# Patient Record
Sex: FEMALE | Race: WHITE | NOT HISPANIC OR LATINO
[De-identification: names, ages, dates, MRNs, and addresses within clinical notes are randomized per-mention and may not be internally consistent; named-entity substitution may affect disease eponyms.]

---

## 2017-08-02 ENCOUNTER — RESULT REVIEW (OUTPATIENT)
Age: 22
End: 2017-08-02

## 2018-06-21 ENCOUNTER — OUTPATIENT (OUTPATIENT)
Dept: OUTPATIENT SERVICES | Facility: HOSPITAL | Age: 23
LOS: 1 days | End: 2018-06-21
Payer: COMMERCIAL

## 2018-06-21 ENCOUNTER — APPOINTMENT (OUTPATIENT)
Dept: ULTRASOUND IMAGING | Facility: IMAGING CENTER | Age: 23
End: 2018-06-21
Payer: COMMERCIAL

## 2018-06-21 DIAGNOSIS — Z00.8 ENCOUNTER FOR OTHER GENERAL EXAMINATION: ICD-10-CM

## 2018-06-21 PROCEDURE — 76642 ULTRASOUND BREAST LIMITED: CPT

## 2018-06-21 PROCEDURE — 76642 ULTRASOUND BREAST LIMITED: CPT | Mod: 26,LT

## 2018-10-15 ENCOUNTER — RESULT REVIEW (OUTPATIENT)
Age: 23
End: 2018-10-15

## 2019-05-30 ENCOUNTER — TRANSCRIBE ORDERS (OUTPATIENT)
Dept: SCHEDULING | Facility: REHABILITATION | Age: 24
End: 2019-05-30

## 2019-05-30 DIAGNOSIS — N81.84 PELVIC MUSCLE WASTING: ICD-10-CM

## 2019-05-30 DIAGNOSIS — R10.2 PELVIC AND PERINEAL PAIN: Primary | ICD-10-CM

## 2019-06-19 ENCOUNTER — HOSPITAL ENCOUNTER (OUTPATIENT)
Dept: PHYSICAL THERAPY | Facility: HOSPITAL | Age: 24
Setting detail: THERAPIES SERIES
Discharge: HOME | End: 2019-06-19
Attending: PSYCHIATRY & NEUROLOGY
Payer: COMMERCIAL

## 2019-06-19 DIAGNOSIS — R10.2 PELVIC AND PERINEAL PAIN: ICD-10-CM

## 2019-06-19 DIAGNOSIS — N81.84 PELVIC MUSCLE WASTING: ICD-10-CM

## 2019-06-19 PROCEDURE — 97163 PT EVAL HIGH COMPLEX 45 MIN: CPT

## 2019-06-19 NOTE — LETTER
[address] [place], [place] [address]nnew[facility] [address]  [phone]  [facility]: [phone]    PHYSICAL THERAPY PLAN OF CARE    Patient Name: Jack Vanegas    Certification Dates:  From 19  To: 19  Frequency: 1 time/week Duration: 8 weeks  Other:      Provider: Chiqui Webb PT   Referring Provider: System, Provider Not In  PCP: Willie Schwab      Payor: Payor: [facility] / Plan: [employer] PLAN / Product Type: Managed Care /   Medical Diagnosis: No primary diagnosis found.     Rehab Potential:      Planned Services: The patient's treatment will include  , .     By signing this plan of care, I certify this plan of care as correct and necessary for the patient.        Physician Signature: _________________________________________ Date: _________________    Thank you for this referral. Please contact our department with any questions.      Chiqui Webb PT        Referring Provider: By co-signing this Plan of Care (POC), you agree with the planned services and interventions recommended by the therapist.         PT EVALUATION FOR OUTPATIENT THERAPY    Patient: Jack Vanegas    MRN: [id number]  : 1995 24 y.o.   Referring Physician: System, Provider Not In  Date of Visit: 2019      Certification Dates:   19 through 19    Recommended Frequency & Duration:  1 time/week for up to 8 weeks     Diagnosis:   1. Pelvic and perineal pain    2. Pelvic muscle wasting        Chief Complaints:   Chief Complaint   Patient presents with   • Pain     with penetrative intercourse       Precautions: no known precautions/restrictions    Past Medical History: History reviewed. No pertinent past medical history.    Past Surgical History: History reviewed. No pertinent surgical history.      OBJECTIVE MEASUREMENTS/DATA:    Eval Assessment      General Info         General Information - 19 0803        Session Details    Document Type initial evaluation    Mode of Treatment  physical therapy    Patient/Family Observations Patient attends PT today with complaints of pain with penetrative intercourse. No pain at rest. No history of pelvic pain or difficulty inserting tampon. She states she remembers pain with her first GYN exam. She feels it may be related to her birth control pill and the amount of lubrication she has during intercourse. This has made her fearful of being intimate. She is referred for PT.     OP Specialty Pelvic Floor       Time Calculation    Start Time 0747    Stop Time 0906    Time Calculation (min) 79 min       General Information    Referring Physician Thalia Sierra MD    Pertinent History of Current Functional Problem FSFI = 21.7/36 (Desire = 3.6/6; Arousal = 3.9/6; Lubrication = 4.2/6; Orgasm = 3.6/6; Satisfaction = 4.8/6; Pain = 1.6/6)    Existing Precautions/Restrictions no known precautions/restrictions        Pain and Vitals         Pain/Vitals - 06/19/19 0800        Pain/Comfort/Sleep    Presence of Pain complains of pain/discomfort    Preferred Pain Scale number (Numeric Rating Pain Scale)    Pain Rating (0-10): Pre Activity 6   with penetration during intercourse    Frequency other (see comments)   only with intercourse    Quality sharp;aching       Pain Intervention    Intervention  IE; education    Post Intervention Comments No Pain at evaluation; only with penetrative intercourse        Falls Assessment          Falls - 06/19/19 0807        Initial Falls Assessment    One or more falls in the last year No        Living Environment          Living Environment - 06/19/19 0806        Living Environment    Lives With other (see comments)   roommate    Living Arrangements apartment    at [facility]         Prior Level of Function - 06/19/19 0807        OTHER    Previous level of function Has always had pain with penetrative intercourse; GYN exam        ROM         Range of Motion - 06/19/19 1400        RIGHT: Lower Extremity AROM  Assessment    Hip Flexion Deficit 75 degrees    Hip Abduction Deficit --   moderate restriction    Hip External Rotation Deficit --   moderate restriction       LEFT: Lower Extremity AROM Assessment    Hip Flexion Deficit --   60    Hip Abduction Deficit --   moderate restriction    Hip External Rotation Deficit --   moderate restriction        MMT         Manual Muscle Tests - 06/19/19 1433        RIGHT: Lower Extremity Manual Muscle Test Assessment    Hip Flexion gross movement (5/5) normal    Hip Extension gross movement (5/5) normal    Hip Abduction gross movement (5/5) normal    Knee Flexion strength (5/5) normal    Knee Extension strength (5/5) normal    Ankle Dorsiflexion gross movement (5/5) normal    Ankle Plantarflexion gross movement (5/5) normal       LEFT: Lower Extremity Manual Muscle Test Assessment    Hip Flexion gross movement (5/5) normal    Hip Extension gross movement (5/5) normal    Hip Abduction gross movement (5/5) normal    Knee Flexion strength (4+/5) good plus    Knee Extension strength (5/5) normal    Ankle Dorsiflexion gross movement (5/5) normal    Ankle Plantarflexion gross movement (5/5) normal       Additional Manual Muscle Tests    Additional MMT transverse abdominus = 2+/5        Palpation         Palpation - 06/19/19 1434        Palpation    Trunk Palpation  + tenderness and restriction vaginal introitus; + tenderness and hypertonicity levator ani L > R       Manual Interventions    Manual technique #1 STM transvaginal    Manual technique #2 dilator progression therapy        Pelvic Floor         Pelvic Floor - 06/19/19 0800        Pelvic Floor PMH and Surgical History    Ovaries removed No    Bladder Suspension No    Pregnancy No    Number of pregnancies 0    Menopausal state No    Sexually active Yes    Acute infection No    Post pelvic radiation No    Sexual abuse No       Gynecological History    Endometriosis No    Dysmenorrhea No    Menopause No    PID No    Pelvic Trauma No     Fibroids No   history of ovarian cyst    Prolapse No    Pelvic Pain Yes    Sexually Active Yes       Urologic History    Urgency Yes    Frequency Yes    Leak with coughing Yes    Dysuria No    Difficulty starting flow No    Dribbling after urination No    Blood in urine No    Night voids No    Vulva pressure No    UTI No       Bowel History    Normal Yes    Constipation Yes   BM 4-5x's per week    Loose No    Incontinence No    IBS No       Pelvic Pain    Pain with intercourse Yes    Pain with penetration Yes    History of LBP No    Comments tension noted in bulbocavernosus bilareral as well as levator ani L > R       Pelvic Floor Strength    Power 2    Endurance: Time hold up to 10 seconds 3 seconds    Number of reps at hold time 4 repetitions    Number of fast twitches after 2 min rest 6            Goals        Patient Stated    • patient goals (pt-stated)            Painfree intercourse      • STG/LTG (pt-stated)            STG: Patient will verbalize positioning to relieve PFM in 4 weeks    STG: Patient will demonstrate independence in colon massage and proper defecation mechanics to promote good bowel function in 4 weeks    STG: Patient will demonstrate the use of #2 dilator with pain level of </= 4/10    STG: Patient will perform her/his home program with supervision in 4 weeks    LTG: Patient will report an improvement in sexual function by scoring </= 15/36    LTG: Patient will demonstrate independence in self-STM externally and internally in 8 weeks    LTG: Patient will demonstrate independence in use of #3-4  Dilator with pain level of </= 3/10  in 8 weeks    LTG: Patient will report a return to intercourse with pain of </= 3/10 in 8-12  weeks                  TREATMENT PLAN:      Exercises Current Session Time   NEURO RE-ED TOTAL TIME FOR SESSION Not performed   Diaphragm breathing            THER ACT TOTAL TIME FOR SESSION 0-7 Minutes   HEP Stretches to B hips; purchase dilator kit; proper defecation  mechanics; colon massage           THER EX TOTAL TIME FOR SESSION Not performed               MANUAL TOTAL TIME FOR SESSION Not performed       MODALITIES TOTAL TIME FOR SESSION Not performed                     ASSESSMENT:    This 24 y.o. year old female presents to PT with above stated diagnosis. Physical Therapy evaluation reveals   resulting in   limitations. Jack Vanegas will benefit from skilled PT services to address limitation, work towards rehab and patient goals and maximize PLOF of chosen ADLs.     Planned Services: The patient's treatment will include  , .

## 2019-06-19 NOTE — PROGRESS NOTES
Referring Provider: By co-signing this Plan of Care (POC), you agree with the planned services and interventions recommended by the therapist.         PT EVALUATION FOR OUTPATIENT THERAPY    Patient: Jack Vanegas    MRN: [id number]  : 1995 24 y.o.   Referring Physician: System, Provider Not In  Date of Visit: 2019      Certification Dates:   19 through 19    Recommended Frequency & Duration:  1 time/week for up to 8 weeks     Diagnosis:   1. Pelvic and perineal pain    2. Pelvic muscle wasting        Chief Complaints:   Chief Complaint   Patient presents with   • Pain     with penetrative intercourse       Precautions: no known precautions/restrictions    Past Medical History: History reviewed. No pertinent past medical history.    Past Surgical History: History reviewed. No pertinent surgical history.      OBJECTIVE MEASUREMENTS/DATA:    Eval Assessment      General Info         General Information - 19 0803        Session Details    Document Type initial evaluation    Mode of Treatment physical therapy    Patient/Family Observations Patient attends PT today with complaints of pain with penetrative intercourse. No pain at rest. No history of pelvic pain or difficulty inserting tampon. She states she remembers pain with her first GYN exam. She feels it may be related to her birth control pill and the amount of lubrication she has during intercourse. This has made her fearful of being intimate. She is referred for PT.     OP Specialty Pelvic Floor       Time Calculation    Start Time 0747    Stop Time 0906    Time Calculation (min) 79 min       General Information    Referring Physician Thalia Sierra MD    Pertinent History of Current Functional Problem FSFI = 21.7/36 (Desire = 3.6/6; Arousal = 3.9/6; Lubrication = 4.2/6; Orgasm = 3.6/6; Satisfaction = 4.8/6; Pain = 1.6/6)    Existing Precautions/Restrictions no known precautions/restrictions        Pain and Vitals          Pain/Vitals - 06/19/19 0800        Pain/Comfort/Sleep    Presence of Pain complains of pain/discomfort    Preferred Pain Scale number (Numeric Rating Pain Scale)    Pain Rating (0-10): Pre Activity 6   with penetration during intercourse    Frequency other (see comments)   only with intercourse    Quality sharp;aching       Pain Intervention    Intervention  IE; education    Post Intervention Comments No Pain at evaluation; only with penetrative intercourse        Falls Assessment          Falls - 06/19/19 0807        Initial Falls Assessment    One or more falls in the last year No        Living Environment          Living Environment - 06/19/19 0806        Living Environment    Lives With other (see comments)   roommate    Living Arrangements apartment    at [facility]         Prior Level of Function - 06/19/19 0807        OTHER    Previous level of function Has always had pain with penetrative intercourse; GYN exam        ROM         Range of Motion - 06/19/19 1400        RIGHT: Lower Extremity AROM Assessment    Hip Flexion Deficit 75 degrees    Hip Abduction Deficit --   moderate restriction    Hip External Rotation Deficit --   moderate restriction       LEFT: Lower Extremity AROM Assessment    Hip Flexion Deficit --   60    Hip Abduction Deficit --   moderate restriction    Hip External Rotation Deficit --   moderate restriction        MMT         Manual Muscle Tests - 06/19/19 1433        RIGHT: Lower Extremity Manual Muscle Test Assessment    Hip Flexion gross movement (5/5) normal    Hip Extension gross movement (5/5) normal    Hip Abduction gross movement (5/5) normal    Knee Flexion strength (5/5) normal    Knee Extension strength (5/5) normal    Ankle Dorsiflexion gross movement (5/5) normal    Ankle Plantarflexion gross movement (5/5) normal       LEFT: Lower Extremity Manual Muscle Test Assessment    Hip Flexion gross movement (5/5) normal    Hip Extension gross movement  (5/5) normal    Hip Abduction gross movement (5/5) normal    Knee Flexion strength (4+/5) good plus    Knee Extension strength (5/5) normal    Ankle Dorsiflexion gross movement (5/5) normal    Ankle Plantarflexion gross movement (5/5) normal       Additional Manual Muscle Tests    Additional MMT transverse abdominus = 2+/5        Palpation         Palpation - 06/19/19 1434        Palpation    Trunk Palpation  + tenderness and restriction vaginal introitus; + tenderness and hypertonicity levator ani L > R       Manual Interventions    Manual technique #1 STM transvaginal    Manual technique #2 dilator progression therapy        Pelvic Floor         Pelvic Floor - 06/19/19 0800        Pelvic Floor PMH and Surgical History    Ovaries removed No    Bladder Suspension No    Pregnancy No    Number of pregnancies 0    Menopausal state No    Sexually active Yes    Acute infection No    Post pelvic radiation No    Sexual abuse No       Gynecological History    Endometriosis No    Dysmenorrhea No    Menopause No    PID No    Pelvic Trauma No    Fibroids No   history of ovarian cyst    Prolapse No    Pelvic Pain Yes    Sexually Active Yes       Urologic History    Urgency Yes    Frequency Yes    Leak with coughing Yes    Dysuria No    Difficulty starting flow No    Dribbling after urination No    Blood in urine No    Night voids No    Vulva pressure No    UTI No       Bowel History    Normal Yes    Constipation Yes   BM 4-5x's per week    Loose No    Incontinence No    IBS No       Pelvic Pain    Pain with intercourse Yes    Pain with penetration Yes    History of LBP No    Comments tension noted in bulbocavernosus bilareral as well as levator ani L > R       Pelvic Floor Strength    Power 2    Endurance: Time hold up to 10 seconds 3 seconds    Number of reps at hold time 4 repetitions    Number of fast twitches after 2 min rest 6            Goals        Patient Stated    • patient goals (pt-stated)            Painfree  intercourse      • STG/LTG (pt-stated)            STG: Patient will verbalize positioning to relieve PFM in 4 weeks    STG: Patient will demonstrate independence in colon massage and proper defecation mechanics to promote good bowel function in 4 weeks    STG: Patient will demonstrate the use of #2 dilator with pain level of </= 4/10    STG: Patient will perform her/his home program with supervision in 4 weeks    LTG: Patient will report an improvement in sexual function by scoring </= 15/36    LTG: Patient will demonstrate independence in self-STM externally and internally in 8 weeks    LTG: Patient will demonstrate independence in use of #3-4  Dilator with pain level of </= 3/10  in 8 weeks    LTG: Patient will report a return to intercourse with pain of </= 3/10 in 8-12  weeks                  TREATMENT PLAN:      Exercises Current Session Time   NEURO RE-ED TOTAL TIME FOR SESSION Not performed   Diaphragm breathing            THER ACT TOTAL TIME FOR SESSION 0-7 Minutes   HEP Stretches to B hips; purchase dilator kit; proper defecation mechanics; colon massage           THER EX TOTAL TIME FOR SESSION Not performed               MANUAL TOTAL TIME FOR SESSION Not performed       MODALITIES TOTAL TIME FOR SESSION Not performed                     ASSESSMENT:    This 24 y.o. year old female presents to PT with above stated diagnosis. Physical Therapy evaluation reveals   resulting in   limitations. Jack Vanegas will benefit from skilled PT services to address limitation, work towards rehab and patient goals and maximize PLOF of chosen ADLs.     Planned Services: The patient's treatment will include  , .

## 2019-06-19 NOTE — OP PT TREATMENT LOG
Exercises Current Session Time   NEURO RE-ED TOTAL TIME FOR SESSION Not performed   Diaphragm breathing            THER ACT TOTAL TIME FOR SESSION 0-7 Minutes   HEP Stretches to B hips; purchase dilator kit; proper defecation mechanics; colon massage           THER EX TOTAL TIME FOR SESSION Not performed               MANUAL TOTAL TIME FOR SESSION Not performed       MODALITIES TOTAL TIME FOR SESSION Not performed

## 2019-07-02 ENCOUNTER — HOSPITAL ENCOUNTER (OUTPATIENT)
Dept: PHYSICAL THERAPY | Facility: HOSPITAL | Age: 24
Setting detail: THERAPIES SERIES
Discharge: HOME | End: 2019-07-02
Attending: OBSTETRICS & GYNECOLOGY
Payer: COMMERCIAL

## 2019-07-02 DIAGNOSIS — R10.2 PELVIC AND PERINEAL PAIN: Primary | ICD-10-CM

## 2019-07-02 PROCEDURE — 97530 THERAPEUTIC ACTIVITIES: CPT | Mod: GP

## 2019-07-02 PROCEDURE — 97140 MANUAL THERAPY 1/> REGIONS: CPT | Mod: GP

## 2019-07-02 PROCEDURE — 97010 HOT OR COLD PACKS THERAPY: CPT | Mod: GP

## 2019-07-02 NOTE — PROGRESS NOTES
PT DAILY NOTE FOR OUTPATIENT THERAPY    Patient: Jack Vanegas   MRN: [id number]  : 1995 24 y.o.  Referring Physician: System, Provider Not In  Date of Visit: 2019      Certification Dates: 19 through 19    Diagnosis:   1. Pelvic and perineal pain        Chief Complaints:   Chief Complaint   Patient presents with   • Other     Pelvic pain only with intercourse       Precautions:        TODAY'S VISIT          General Information - 19 1708        Session Details    Document Type daily treatment    Mode of Treatment physical therapy    Patient/Family Observations Patient arrived with the dilators today. She is anxious to get started as this issue has taken a toll on her mental health as well.     OP Specialty Pelvic Floor       Time Calculation    Start Time 1704    Stop Time 1803    Time Calculation (min) 59 min       General Information    Existing Precautions/Restrictions --              Pain/Vitals - 19 1714        Pain/Comfort/Sleep    Presence of Pain denies       Pain Intervention    Intervention  MH; STM external and internal; initiate dilator              Daily Falls Screen - 19 1717        Daily Falls Assessment    Patient reported fall since last visit No              Daily Treatment Assessment and Plan - 19 1804        Daily Treatment Assessment and Plan    Progress toward goals Progressing    Daily Outcome Summary Moderate tension noted in L bulbocavernosus as well as B deep levator ani; patient able to insert the dilator #2 with some stretch; Guided patient to maneuver the dilator to apply gentle pressure in the deep muscles; inserted dilator #3 with greater tension.    Plan and Recommendations Patient to practice with dilator #2 at home every other day; issued bowel regularity recipe.          OBJECTIVE DATA TAKEN TODAY:    None taken    Today's Treatment:      Exercises Current Session Time   NEURO RE-ED TOTAL TIME FOR SESSION Not performed   Diaphragm  breathing            THER ACT TOTAL TIME FOR SESSION 23-37min   HEP Stretches to B hips; purchase dilator kit; proper defecation mechanics; colon massage    Dilator #2 for a stretch; guided patient with movement of the handle to apply pressure in the deeper musculature    Dilator #3 for stretch   THER EX TOTAL TIME FOR SESSION Not performed               MANUAL TOTAL TIME FOR SESSION 23-37min   STM to B adductors; low abs Transvaginal stretch external and internal levator ani   MODALITIES TOTAL TIME FOR SESSION 0-7min   MH to perineum pre session and on abdominals during manual therapy

## 2019-07-02 NOTE — OP PT TREATMENT LOG
Exercises Current Session Time   NEURO RE-ED TOTAL TIME FOR SESSION Not performed   Diaphragm breathing            THER ACT TOTAL TIME FOR SESSION 23-37min   HEP Stretches to B hips; purchase dilator kit; proper defecation mechanics; colon massage    Dilator #2 for a stretch; guided patient with movement of the handle to apply pressure in the deeper musculature    Dilator #3 for stretch   THER EX TOTAL TIME FOR SESSION Not performed               MANUAL TOTAL TIME FOR SESSION 23-37min   STM to B adductors; low abs Transvaginal stretch external and internal levator ani   MODALITIES TOTAL TIME FOR SESSION 0-7min   MH to perineum pre session and on abdominals during manual therapy

## 2019-07-15 ENCOUNTER — HOSPITAL ENCOUNTER (OUTPATIENT)
Dept: PHYSICAL THERAPY | Facility: HOSPITAL | Age: 24
Setting detail: THERAPIES SERIES
Discharge: HOME | End: 2019-07-15
Attending: OBSTETRICS & GYNECOLOGY
Payer: COMMERCIAL

## 2019-07-15 DIAGNOSIS — R10.2 PELVIC AND PERINEAL PAIN: Primary | ICD-10-CM

## 2019-07-15 PROCEDURE — 97140 MANUAL THERAPY 1/> REGIONS: CPT | Mod: GP

## 2019-07-15 PROCEDURE — 97530 THERAPEUTIC ACTIVITIES: CPT | Mod: GP

## 2019-07-15 PROCEDURE — 97010 HOT OR COLD PACKS THERAPY: CPT | Mod: GP

## 2019-07-15 NOTE — PROGRESS NOTES
PT DAILY NOTE FOR OUTPATIENT THERAPY    Patient: Jack Vanegas   MRN: [id number]  : 1995 24 y.o.  Referring Physician: System, Provider Not In  Date of Visit: 7/15/2019      Certification Dates: 19 through 19    Diagnosis:   1. Pelvic and perineal pain        Chief Complaints:   Chief Complaint   Patient presents with   • Pain     with penetrative intercourse       Precautions: no known precautions/restrictions      TODAY'S VISIT          General Information - 07/15/19 1000        Session Details    Document Type daily treatment    Mode of Treatment physical therapy    Patient/Family Observations Patient states she has been using the dilator #2.     OP Specialty Pelvic Floor       Time Calculation    Start Time 1000    Stop Time 1105    Time Calculation (min) 65 min       General Information    Existing Precautions/Restrictions no known precautions/restrictions              Pain/Vitals - 07/15/19 1004        Pain/Comfort/Sleep    Presence of Pain denies       Pain Intervention    Intervention  MH; STM; dilator              Daily Falls Screen - 07/15/19 1005        Daily Falls Assessment    Patient reported fall since last visit No              Daily Treatment Assessment and Plan - 07/15/19 1045        Daily Treatment Assessment and Plan    Progress toward goals Progressing    Daily Outcome Summary Tension noted in R anterior levator ani as well as left posterior levator ani. Patient tolerating dilator #2 with no pain; also worked with dilator #3; insertion of 3 no pain; cued patient to release tension in body during dilator therapy.     Plan and Recommendations Continue with dilator therapy at home. Patient andino start with #2 and progress to #3. Cued patient to lengthen PFM during insertion of dilators and with movement of the dilators in the clinic and at home          OBJECTIVE DATA TAKEN TODAY:    None taken    Today's Treatment:      Exercises Current Session Time   NEURO RE-ED TOTAL  TIME FOR SESSION Not performed   Diaphragm breathing            THER ACT TOTAL TIME FOR SESSION 8-22min   HEP     Dilator #2 for a stretch; guided patient with movement of the handle to apply pressure in the deeper musculature    Dilator #3 for stretch; VC for patient to lengthen PFM during insertion of dilator   THER EX TOTAL TIME FOR SESSION Not performed               MANUAL TOTAL TIME FOR SESSION 23-37min   STM to B adductors; low abs Transvaginal stretch external and internal levator ani (right anterior and left posterior levator ani tension and tenderness noted)    MODALITIES TOTAL TIME FOR SESSION 8-22min   MH to perineum pre session and on abdominals during manual therapy Ice to perineum post session

## 2019-07-15 NOTE — OP PT TREATMENT LOG
Exercises Current Session Time   NEURO RE-ED TOTAL TIME FOR SESSION Not performed   Diaphragm breathing            THER ACT TOTAL TIME FOR SESSION 8-22min   HEP     Dilator #2 for a stretch; guided patient with movement of the handle to apply pressure in the deeper musculature    Dilator #3 for stretch; VC for patient to lengthen PFM during insertion of dilator   THER EX TOTAL TIME FOR SESSION Not performed               MANUAL TOTAL TIME FOR SESSION 23-37min   STM to B adductors; low abs Transvaginal stretch external and internal levator ani (right anterior and left posterior levator ani tension and tenderness noted)    MODALITIES TOTAL TIME FOR SESSION 8-22min   MH to perineum pre session and on abdominals during manual therapy Ice to perineum post session

## 2019-07-22 ENCOUNTER — HOSPITAL ENCOUNTER (OUTPATIENT)
Dept: PHYSICAL THERAPY | Facility: HOSPITAL | Age: 24
Setting detail: THERAPIES SERIES
Discharge: HOME | End: 2019-07-22
Attending: OBSTETRICS & GYNECOLOGY
Payer: COMMERCIAL

## 2019-07-22 DIAGNOSIS — R10.2 PELVIC AND PERINEAL PAIN: Primary | ICD-10-CM

## 2019-07-22 PROCEDURE — 97140 MANUAL THERAPY 1/> REGIONS: CPT | Mod: GP

## 2019-07-22 PROCEDURE — 97110 THERAPEUTIC EXERCISES: CPT | Mod: GP

## 2019-07-22 PROCEDURE — 97530 THERAPEUTIC ACTIVITIES: CPT | Mod: GP

## 2019-07-22 NOTE — OP PT TREATMENT LOG
Exercises Current Session Time   NEURO RE-ED TOTAL TIME FOR SESSION Not performed   Diaphragm breathing            THER ACT TOTAL TIME FOR SESSION 8-22min   HEP Patient to purchase soft foam roller; self STM externally (adductor, perineum, lower ab); use of dilator #2 and #3        Initiated Dilator #3 for stretch; VC for patient to lengthen PFM during insertion of dilator   THER EX TOTAL TIME FOR SESSION 8-22min   Foam roller Sacrum; gluteals; piriformis; quads; HS; calves; ITB           MANUAL TOTAL TIME FOR SESSION 23-37min   STM to B adductors; low abs Transvaginal stretch external and internal levator ani (Left bulbocavernosus and right deep levator ani tender and restricted today)   MODALITIES TOTAL TIME FOR SESSION 0-7min   MH to perineum pre session and on abdominals during manual therapy Ice to perineum post session

## 2019-07-22 NOTE — PROGRESS NOTES
PT DAILY NOTE FOR OUTPATIENT THERAPY    Patient: Jack Vanegas   MRN: [id number]  : 1995 24 y.o.  Referring Physician: System, Provider Not In  Date of Visit: 2019      Certification Dates: 19 through 19    Diagnosis:   1. Pelvic and perineal pain        Chief Complaints:   Chief Complaint   Patient presents with   • Pain     with penetrative intercourse       Precautions: no known precautions/restrictions      TODAY'S VISIT          General Information - 19 0801        Session Details    Document Type daily treatment    Mode of Treatment physical therapy    Patient/Family Observations Patient states she has been using the #2 and then move to the #3. Patient also reports she accidentally used the #4 and it wasn't too bad. She reports no pain upon arrival.     OP Specialty Pelvic Floor       Time Calculation    Start Time 801    Stop Time 901    Time Calculation (min) 60 min       General Information    Existing Precautions/Restrictions no known precautions/restrictions              Pain/Vitals - 19 0807        Pain/Comfort/Sleep    Presence of Pain denies       Pain Intervention    Intervention  MH; STM; dilator              Daily Falls Screen - 19 0808        Daily Falls Assessment    Patient reported fall since last visit No              Daily Treatment Assessment and Plan - 19 0907        Daily Treatment Assessment and Plan    Progress toward goals Progressing    Daily Outcome Summary Some tension and tenderness in L bulbocavernosus; Patient notes the #3 dilator feels like a stretch today. Encouraged patient to go gentle and not force any stretch. She was educated in use of foam roller to release fascial restrictions in hips and legs.     Plan and Recommendations Progress with dilator therapy as tolerated. Will progress to #4 when indicated.           OBJECTIVE DATA TAKEN TODAY:    None taken    Today's Treatment:      Exercises Current Session Time   NEURO  RE-ED TOTAL TIME FOR SESSION Not performed   Diaphragm breathing            THER ACT TOTAL TIME FOR SESSION 8-22min   HEP Patient to purchase soft foam roller; self STM externally (adductor, perineum, lower ab); use of dilator #2 and #3        Initiated Dilator #3 for stretch; VC for patient to lengthen PFM during insertion of dilator   THER EX TOTAL TIME FOR SESSION 8-22min   Foam roller Sacrum; gluteals; piriformis; quads; HS; calves; ITB           MANUAL TOTAL TIME FOR SESSION 23-37min   STM to B adductors; low abs Transvaginal stretch external and internal levator ani (Left bulbocavernosus and right deep levator ani tender and restricted today)   MODALITIES TOTAL TIME FOR SESSION 0-7min   MH to perineum pre session and on abdominals during manual therapy Ice to perineum post session

## 2019-08-07 ENCOUNTER — HOSPITAL ENCOUNTER (OUTPATIENT)
Dept: PHYSICAL THERAPY | Facility: HOSPITAL | Age: 24
Setting detail: THERAPIES SERIES
Discharge: HOME | End: 2019-08-07
Payer: COMMERCIAL

## 2019-08-07 DIAGNOSIS — R10.2 PELVIC AND PERINEAL PAIN: Primary | ICD-10-CM

## 2019-08-07 PROCEDURE — 97530 THERAPEUTIC ACTIVITIES: CPT | Mod: GP

## 2019-08-07 PROCEDURE — 97010 HOT OR COLD PACKS THERAPY: CPT | Mod: GP

## 2019-08-07 PROCEDURE — 97140 MANUAL THERAPY 1/> REGIONS: CPT | Mod: GP

## 2019-08-07 NOTE — PROGRESS NOTES
PT DAILY NOTE FOR OUTPATIENT THERAPY    Patient: Jack aVnegas   MRN: [id number]  : 1995 24 y.o.  Referring Physician: System, Provider Not In  Date of Visit: 2019      Certification Dates: 19 through 19    Diagnosis:   1. Pelvic and perineal pain        Chief Complaints:   Chief Complaint   Patient presents with   • Pain     with intercourse       Precautions: no known precautions/restrictions      TODAY'S VISIT          General Information - 19 0808        Session Details    Document Type daily treatment   progress note    Mode of Treatment physical therapy    Patient/Family Observations Patient reports her frustration with not being able to progress with the dilator to #4. She is ok with #2. She has been using the #3 at home and she feels she has discomfort on the left side.  She was able to have intercourse and it was improved. She states she is able to use the dilator 2 days in a row.     OP Specialty Pelvic Floor       Time Calculation    Start Time 0808    Stop Time 0903    Time Calculation (min) 55 min       General Information    Patient Profile Reviewed? yes    Referring Physician Thalia Sierra MD    Pertinent History of Current Functional Problem fsfi = 24.9/36 (Desire = 4.2; Arousal = 4.5; Lubication = 4.2; orgasm = 4.4; satisfaction = 5.2; pain = 2.4)    Existing Precautions/Restrictions no known precautions/restrictions              Pain/Vitals - 19 0823        Pain/Comfort/Sleep    Presence of Pain denies       Pain Intervention    Intervention  MH; STM; dilator    Post Intervention Comments N/A              Daily Falls Screen - 19 1259        Daily Falls Assessment    Patient reported fall since last visit No              Daily Treatment Assessment and Plan - 19 1304        Daily Treatment Assessment and Plan    Progress toward goals Progressing    Daily Outcome Summary Patient demonstrating breathing dysfunction with L side of diaphragm immobility;  post manual release, patient noted improved breathing patterns and less discomfort with use of dilator. Continues to demonstrate restriction in L bulbocavernosus, R levator ani. She will benefit from continued PT to attain goals.     Plan and Recommendations Continue with progressing with dilator size to #4 when appropriate; review breathing mechanics; manual therapy as needed           OBJECTIVE DATA TAKEN TODAY:    None taken    Today's Treatment:      Exercises Current Session Time   NEURO RE-ED TOTAL TIME FOR SESSION 0-7 Minutes   Diaphragm breathing for ANS quieting yes           THER ACT TOTAL TIME FOR SESSION 23-37 Minutes   reassessment yes   HEP Dilator #3 with gentle movement    Self METS for L superior pube as needed    Diaphragm breathing   THER EX TOTAL TIME FOR SESSION Not performed               MANUAL TOTAL TIME FOR SESSION 23-37 Minutes   MFR to B adductors/low abs; diaphragm release with exhale; mobilization of ribs with inferior glide upon exhale; STM to levator ani (superficial and deep) transvaginal with gentle stretch to L bulbocavernosus METS for L superior pube                MODALITIES TOTAL TIME FOR SESSION 0-7 Minutes   MH to perineum during session

## 2019-08-12 ENCOUNTER — HOSPITAL ENCOUNTER (OUTPATIENT)
Dept: PHYSICAL THERAPY | Facility: HOSPITAL | Age: 24
Setting detail: THERAPIES SERIES
Discharge: HOME | End: 2019-08-12
Payer: COMMERCIAL

## 2019-08-12 DIAGNOSIS — R10.2 PELVIC AND PERINEAL PAIN: Primary | ICD-10-CM

## 2019-08-12 PROCEDURE — 97530 THERAPEUTIC ACTIVITIES: CPT | Mod: GP

## 2019-08-12 PROCEDURE — 97140 MANUAL THERAPY 1/> REGIONS: CPT | Mod: GP

## 2019-08-12 NOTE — OP PT TREATMENT LOG
Exercises Current Session Time   NEURO RE-ED TOTAL TIME FOR SESSION Not performed   Diaphragm breathing for ANS quieting yes           THER ACT TOTAL TIME FOR SESSION 23-37 Minutes       HEP Dilator #3 with gentle movement, positioning for release of L deep levator ani; foam roller for quads, adductors, quad stretch with strap prone, QL stretch supine        Diaphragm breathing   THER EX TOTAL TIME FOR SESSION Not performed               MANUAL TOTAL TIME FOR SESSION 23-37 Minutes   MFR to B adductors; diaphragm release with exhale; STM to levator ani (superficial and deep) transvaginal with gentle stretch to L bulbocavernosus METS for L superior pube                MODALITIES TOTAL TIME FOR SESSION 0-7 Minutes   MH to perineum during session

## 2019-08-12 NOTE — PROGRESS NOTES
PT DAILY NOTE FOR OUTPATIENT THERAPY    Patient: Jack Vanegas   MRN: [id number]  : 1995 24 y.o.  Referring Physician: System, Provider Not In  Date of Visit: 2019      Certification Dates: 19 through 19    Diagnosis:   1. Pelvic and perineal pain        Chief Complaints:   Chief Complaint   Patient presents with   • Pain     Low back/perineum       Precautions:        TODAY'S VISIT          General Information - 19 0804        Session Details    Document Type daily treatment    Mode of Treatment physical therapy    Patient/Family Observations Patient reports she feels a little sore today. She did the dilator #3 at home once. She feels her body is sore.     OP Specialty Pelvic Floor       Time Calculation    Start Time 0804    Stop Time 0900    Time Calculation (min) 56 min              Pain/Vitals - 19 0806        Pain/Comfort/Sleep    Presence of Pain complains of pain/discomfort    Preferred Pain Scale number (Numeric Rating Pain Scale)    Pain Rating (0-10): Pre Activity 2       Pain    Pain Body Location - Side Bilateral              Daily Falls Screen - 19 0809        Daily Falls Assessment    Patient reported fall since last visit No              Daily Treatment Assessment and Plan - 19 0901        Daily Treatment Assessment and Plan    Progress toward goals Progressing    Daily Outcome Summary Patient continues to demonstrate muscle tension L levator ani, however, good release with manual therapy. Patient educated in use of roam roller medium density for soft tissue release and hydration to the tissue. Issued written instructions    Plan and Recommendations Progress with dilator therapy to include #4 when appropriate.           OBJECTIVE DATA TAKEN TODAY:    None taken    Today's Treatment:      Exercises Current Session Time   NEURO RE-ED TOTAL TIME FOR SESSION Not performed   Diaphragm breathing for ANS quieting yes           THER ACT TOTAL TIME FOR  SESSION 23-37 Minutes       HEP Dilator #3 with gentle movement, positioning for release of L deep levator ani; foam roller for quads, adductors, quad stretch with strap prone, QL stretch supine        Diaphragm breathing   THER EX TOTAL TIME FOR SESSION Not performed               MANUAL TOTAL TIME FOR SESSION 23-37 Minutes   MFR to B adductors; diaphragm release with exhale; STM to levator ani (superficial and deep) transvaginal with gentle stretch to L bulbocavernosus METS for L superior pube                MODALITIES TOTAL TIME FOR SESSION 0-7 Minutes   MH to perineum during session

## 2019-09-11 ENCOUNTER — HOSPITAL ENCOUNTER (OUTPATIENT)
Dept: PHYSICAL THERAPY | Facility: HOSPITAL | Age: 24
Setting detail: THERAPIES SERIES
Discharge: HOME | End: 2019-09-11
Attending: OBSTETRICS & GYNECOLOGY
Payer: COMMERCIAL

## 2019-09-11 DIAGNOSIS — R10.2 PELVIC AND PERINEAL PAIN: Primary | ICD-10-CM

## 2019-09-11 DIAGNOSIS — N81.84 PELVIC MUSCLE WASTING: ICD-10-CM

## 2019-09-11 PROCEDURE — 97140 MANUAL THERAPY 1/> REGIONS: CPT | Mod: GP

## 2019-09-11 PROCEDURE — 97530 THERAPEUTIC ACTIVITIES: CPT | Mod: GP

## 2019-09-11 NOTE — LETTER
[address] [place] [address]  [place] [address]  [phone]  [facility]: [phone]    PHYSICAL THERAPY PLAN OF CARE    Patient Name: Jack Vanegas    Certification Dates:  From 19  To: 11/15/19  Frequency: 1 time/week Duration: 8 weeks  Other: bimonthly visits for 8 weeks    Provider: Chiqui Webb PT   Referring Provider: System, Provider Not In  PCP: Willie Schwab      Payor: Payor: [place] [employer] / Plan: [employer] / Product Type: Managed Care /   Medical Diagnosis: Pelvic and perineal pain [R10.2]     Rehab Potential:      Planned Services: The patient's treatment will include  , .     By signing this plan of care, I certify this plan of care as correct and necessary for the patient.        Physician Signature: _________________________________________ Date: _________________    Thank you for this referral. Please contact our department with any questions.      Chiqui Webb PT      PT PROGRESS NOTE FOR OUTPATIENT THERAPY    Patient: Jack Vanegas   MRN: [id number]  : 1995 24 y.o.  Referring Physician: System, Provider Not In  Date of Visit: 2019      Certification Dates: 19 through 11/15/19    Recommended Frequency & Duration:  1 time/week for up to 8 weeks     Diagnosis:   1. Pelvic and perineal pain    2. Pelvic muscle wasting        Chief Complaints:   Chief Complaint   Patient presents with   • Pain     with penetrative intercourse       Precautions:      TODAY'S VISIT:          General Information - 19 0804        Session Details    Document Type daily treatment   progress note    Mode of Treatment physical therapy    Patient/Family Observations Patient reports she was ill and started school. She was also having side effects of birth control. She has been using dilator #4 at home and feels penetrative intercourse has been better.     OP Specialty Pelvic Floor       Time Calculation    Start Time 0804    Stop Time 0903    Time Calculation  (min) 59 min              Pain/Vitals - 09/11/19 0813        Pain/Comfort/Sleep    Presence of Pain denies       Pain Intervention    Intervention  STM to B adductors, PFM (gentle vaginal stretch; STM to B levator ani)    Post Intervention Comments N/A              Daily Falls Screen - 09/11/19 0814        Daily Falls Assessment    Patient reported fall since last visit No              Daily Treatment Assessment and Plan - 09/11/19 0915        Daily Treatment Assessment and Plan    Progress toward goals Progressing    Daily Outcome Summary Patiet demonstrates muscle tension in levator ani with more tenderness posterior over perineal body; tissue seems to release with manual therapy and VC's for breath awareness and conscious releasing of the muscles. Patient able to use dilator #4 today with decreased discomfort.     Plan and Recommendations Patient making progress toward goals. Will progress with treatment and education in self care and change frequency of visits for 2x month. Next visit assess resting tone of PFM and discusss status of penetrative intercourse.           OBJECTIVE MEASUREMENTS/DATA:    Palpation         Palpation - 09/11/19 0918        Palpation    Trunk Palpation  + tenderness at vaginal introitus more posterior; levator ani appears less tense with less discomfort upon palpation. Greater recruitment of PFM, however, endurance compromised.        Manual Interventions    Manual technique #1 STM transvaginal    Manual technique #2 dilator progression therapy        Pelvic Floor         Pelvic Floor - 09/11/19 0900        Pelvic Pain    Pain with intercourse Yes   discomfort noted - improved from last assessment    Pain with penetration Yes   more specifically posterior    Comments tension improved in both superficial and deep musculature with less discomfort. Good release with manual therapy. Tension more L than R          Today's Treatment::      Exercises Current Session Time   NEURO RE-ED TOTAL  TIME FOR SESSION Not performed               THER ACT TOTAL TIME FOR SESSION 23-37 Minutes   HEP [place] recommendations (issued written instructions); dilator #4 use 2-3x's week and pre-intercourse if needed; stretching; diaphragm breathing; reverse kegel to lengthen PFM; self STM internally (particularly posterior)   Reassessment yes       THER EX TOTAL TIME FOR SESSION Not performed               MANUAL TOTAL TIME FOR SESSION 23-37min   STM to B adductors; B levator ani; vaginal stretch    MODALITIES TOTAL TIME FOR SESSION Not performed                   Goals Addressed        Patient Stated    • STG/LTG (pt-stated)                  STG: Patient will verbalize positioning to relieve PFM in 4 weeks MET 8/7/19    STG: Patient will demonstrate independence in colon massage and proper defecation mechanics to promote good bowel function in 4 weeks MET 8/7/19    STG: Patient will demonstrate the use of #2 dilator with pain level of </= 4/10 MET 8/7/19    STG: Patient will perform her/his home program with supervision in 4 weeks ONGOING 9/11/19    LTG: Patient will report an improvement in sexual function by scoring >/= 25/36 ONGOING (24.9) 9/11/19    LTG: Patient will demonstrate independence in self-STM externally and internally in 8 weeks ONGOING 9/11/19    LTG: Patient will demonstrate independence in use of #3-4  Dilator with pain level of </= 3/10  in 8 weeks IMPROVING 9/11/19 (#4)    LTG: Patient will report a return to intercourse with pain of </= 3/10 in 8-12  Weeks IMPROVING 9/11/19    LTG: Patient will demonstrate proper breathing techniques to release diaphragm, promote balancing of ANS, and normalize PFM tone IMPROVING 9/11/19    LTG: Patient will demonstrate independence in management of her condition to promote painfree penetrative intercourse. NEW 9/11/19                Clinical Impression: Patient has attended 7 sessions of PT for painful penetrative intercourse and vaginismus. She is currently  using dilator #4 and is feeling more comfortable with insertion as well as movement of dilator. Discomfort is still present, however, intensity has reduced. She also demonstrates improvement in tension in PFM and ability to actively release muscles with manual therapy and use of dilator. She will benefit from continued PT to attain goals. I am recommending 2x monthly for 2 months to achieve her goals.

## 2019-09-11 NOTE — OP PT TREATMENT LOG
Exercises Current Session Time   NEURO RE-ED TOTAL TIME FOR SESSION Not performed               THER ACT TOTAL TIME FOR SESSION 23-37 Minutes   HEP [place] recommendations (issued written instructions); dilator #4 use 2-3x's week and pre-intercourse if needed; stretching; diaphragm breathing; reverse kegel to lengthen PFM; self STM internally (particularly posterior)   Reassessment yes       THER EX TOTAL TIME FOR SESSION Not performed               MANUAL TOTAL TIME FOR SESSION 23-37min   STM to B adductors; B levator ani; vaginal stretch    MODALITIES TOTAL TIME FOR SESSION Not performed

## 2019-09-11 NOTE — PROGRESS NOTES
PT PROGRESS NOTE FOR OUTPATIENT THERAPY    Patient: Jack Vanegas   MRN: [id number]  : 1995 24 y.o.  Referring Physician: System, Provider Not In  Date of Visit: 2019      Certification Dates: 19 through 11/15/19    Recommended Frequency & Duration:  1 time/week for up to 8 weeks     Diagnosis:   1. Pelvic and perineal pain    2. Pelvic muscle wasting        Chief Complaints:   Chief Complaint   Patient presents with   • Pain     with penetrative intercourse       Precautions:      TODAY'S VISIT:          General Information - 19 0804        Session Details    Document Type daily treatment   progress note    Mode of Treatment physical therapy    Patient/Family Observations Patient reports she was ill and started school. She was also having side effects of birth control. She has been using dilator #4 at home and feels penetrative intercourse has been better.     OP Specialty Pelvic Floor       Time Calculation    Start Time 0804    Stop Time 09    Time Calculation (min) 59 min              Pain/Vitals - 19 0813        Pain/Comfort/Sleep    Presence of Pain denies       Pain Intervention    Intervention  STM to B adductors, PFM (gentle vaginal stretch; STM to B levator ani)    Post Intervention Comments N/A              Daily Falls Screen - 19 0814        Daily Falls Assessment    Patient reported fall since last visit No              Daily Treatment Assessment and Plan - 19 0915        Daily Treatment Assessment and Plan    Progress toward goals Progressing    Daily Outcome Summary Patiet demonstrates muscle tension in levator ani with more tenderness posterior over perineal body; tissue seems to release with manual therapy and VC's for breath awareness and conscious releasing of the muscles. Patient able to use dilator #4 today with decreased discomfort.     Plan and Recommendations Patient making progress toward goals. Will progress with treatment and education in  self care and change frequency of visits for 2x month. Next visit assess resting tone of PFM and discusss status of penetrative intercourse.           OBJECTIVE MEASUREMENTS/DATA:    Palpation         Palpation - 09/11/19 0918        Palpation    Trunk Palpation  + tenderness at vaginal introitus more posterior; levator ani appears less tense with less discomfort upon palpation. Greater recruitment of PFM, however, endurance compromised.        Manual Interventions    Manual technique #1 STM transvaginal    Manual technique #2 dilator progression therapy        Pelvic Floor         Pelvic Floor - 09/11/19 0900        Pelvic Pain    Pain with intercourse Yes   discomfort noted - improved from last assessment    Pain with penetration Yes   more specifically posterior    Comments tension improved in both superficial and deep musculature with less discomfort. Good release with manual therapy. Tension more L than R          Today's Treatment::      Exercises Current Session Time   NEURO RE-ED TOTAL TIME FOR SESSION Not performed               THER ACT TOTAL TIME FOR SESSION 23-37 Minutes   HEP [place] recommendations (issued written instructions); dilator #4 use 2-3x's week and pre-intercourse if needed; stretching; diaphragm breathing; reverse kegel to lengthen PFM; self STM internally (particularly posterior)   Reassessment yes       THER EX TOTAL TIME FOR SESSION Not performed               MANUAL TOTAL TIME FOR SESSION 23-37min   STM to B adductors; B levator ani; vaginal stretch    MODALITIES TOTAL TIME FOR SESSION Not performed                   Goals Addressed        Patient Stated    • STG/LTG (pt-stated)                  STG: Patient will verbalize positioning to relieve PFM in 4 weeks MET 8/7/19    STG: Patient will demonstrate independence in colon massage and proper defecation mechanics to promote good bowel function in 4 weeks MET 8/7/19    STG: Patient will demonstrate the use of #2 dilator with pain  level of </= 4/10 MET 8/7/19    STG: Patient will perform her/his home program with supervision in 4 weeks ONGOING 9/11/19    LTG: Patient will report an improvement in sexual function by scoring >/= 25/36 ONGOING (24.9) 9/11/19    LTG: Patient will demonstrate independence in self-STM externally and internally in 8 weeks ONGOING 9/11/19    LTG: Patient will demonstrate independence in use of #3-4  Dilator with pain level of </= 3/10  in 8 weeks IMPROVING 9/11/19 (#4)    LTG: Patient will report a return to intercourse with pain of </= 3/10 in 8-12  Weeks IMPROVING 9/11/19    LTG: Patient will demonstrate proper breathing techniques to release diaphragm, promote balancing of ANS, and normalize PFM tone IMPROVING 9/11/19    LTG: Patient will demonstrate independence in management of her condition to promote painfree penetrative intercourse. NEW 9/11/19                Clinical Impression: Patient has attended 7 sessions of PT for painful penetrative intercourse and vaginismus. She is currently using dilator #4 and is feeling more comfortable with insertion as well as movement of dilator. Discomfort is still present, however, intensity has reduced. She also demonstrates improvement in tension in PFM and ability to actively release muscles with manual therapy and use of dilator. She will benefit from continued PT to attain goals. I am recommending 2x monthly for 2 months to achieve her goals.

## 2019-09-25 ENCOUNTER — HOSPITAL ENCOUNTER (OUTPATIENT)
Dept: PHYSICAL THERAPY | Facility: HOSPITAL | Age: 24
Setting detail: THERAPIES SERIES
Discharge: HOME | End: 2019-09-25
Payer: COMMERCIAL

## 2019-09-25 DIAGNOSIS — R10.2 PELVIC AND PERINEAL PAIN: Primary | ICD-10-CM

## 2019-09-25 DIAGNOSIS — N81.84 PELVIC MUSCLE WASTING: ICD-10-CM

## 2019-09-25 PROCEDURE — 97140 MANUAL THERAPY 1/> REGIONS: CPT | Mod: GP

## 2019-09-25 PROCEDURE — 97530 THERAPEUTIC ACTIVITIES: CPT | Mod: GP

## 2019-09-25 NOTE — PROGRESS NOTES
PT DAILY NOTE FOR OUTPATIENT THERAPY    Patient: Jack Vanegas   MRN: [id number]  : 1995 24 y.o.  Referring Physician: System, Provider Not In  Date of Visit: 2019      Certification Dates: 19 through 11/15/19    Diagnosis:   1. Pelvic and perineal pain    2. Pelvic muscle wasting        Chief Complaints:   Chief Complaint   Patient presents with   • Pain     pain with penetrative intercourse       Precautions: no known precautions/restrictions      TODAY'S VISIT          General Information - 19 0803        Session Details    Document Type daily treatment    Mode of Treatment physical therapy    Patient/Family Observations Patient states she hasn't been too compliant with dilators since last session. She has had penetrative intercourse and notes in the moment she does not have pain but the day after she has pain and pressure and some pain urinating. Currently patient reports no pain upon arrival.     OP Specialty Pelvic Floor       Time Calculation    Start Time 0803    Stop Time 0858    Time Calculation (min) 55 min       General Information    Existing Precautions/Restrictions no known precautions/restrictions              Pain/Vitals - 19 0810        Pain/Comfort/Sleep    Presence of Pain denies              Daily Falls Screen - 19 0811        Daily Falls Assessment    Patient reported fall since last visit No              Daily Treatment Assessment and Plan - 19 0901        Daily Treatment Assessment and Plan    Progress toward goals Progressing    Daily Outcome Summary moderate tension noted in Left bulbocavernosus and R levator ani; Patient educated and guided with self STM internally on right levator ani; discussed use of pelvic floor massage tool and issued information on ordering product for use at home.    Plan and Recommendations Educate patient in use of pelvic floor massage tool next session; continue manual therapy          OBJECTIVE DATA TAKEN  TODAY:    None taken    Today's Treatment:      Exercises Current Session Time   NEURO RE-ED TOTAL TIME FOR SESSION Not performed               THER ACT TOTAL TIME FOR SESSION 23-37 Minutes   HEP  self STM internally (particularly posterior) guiding patient with verbal cues for correct technique; discussed purchase of pelvic floor massage tool (intimate quentin) and gave patient information on ordering product;            THER EX TOTAL TIME FOR SESSION Not performed               MANUAL TOTAL TIME FOR SESSION 23-37min   STM to B adductors; B levator ani; vaginal stretch    MODALITIES TOTAL TIME FOR SESSION Not performed

## 2019-09-25 NOTE — OP PT TREATMENT LOG
Exercises Current Session Time   NEURO RE-ED TOTAL TIME FOR SESSION Not performed               THER ACT TOTAL TIME FOR SESSION 23-37 Minutes   HEP  self STM internally (particularly posterior) guiding patient with verbal cues for correct technique; discussed purchase of pelvic floor massage tool (intimate quentin) and gave patient information on ordering product;            THER EX TOTAL TIME FOR SESSION Not performed               MANUAL TOTAL TIME FOR SESSION 23-37min   STM to B adductors; B levator ani; vaginal stretch    MODALITIES TOTAL TIME FOR SESSION Not performed

## 2020-12-01 ENCOUNTER — RESULT REVIEW (OUTPATIENT)
Age: 25
End: 2020-12-01

## 2021-02-07 ENCOUNTER — TRANSCRIPTION ENCOUNTER (OUTPATIENT)
Age: 26
End: 2021-02-07

## 2021-11-12 ENCOUNTER — APPOINTMENT (OUTPATIENT)
Dept: DERMATOLOGY | Facility: CLINIC | Age: 26
End: 2021-11-12
Payer: COMMERCIAL

## 2021-11-12 VITALS — BODY MASS INDEX: 22.49 KG/M2 | HEIGHT: 65 IN | WEIGHT: 135 LBS

## 2021-11-12 PROCEDURE — 99204 OFFICE O/P NEW MOD 45 MIN: CPT

## 2021-11-12 RX ORDER — ADAPALENE 1 MG/G
0.1 CREAM TOPICAL
Qty: 1 | Refills: 2 | Status: ACTIVE | COMMUNITY
Start: 2021-11-12 | End: 1900-01-01

## 2021-11-12 NOTE — HISTORY OF PRESENT ILLNESS
[FreeTextEntry1] : acne [de-identified] : 26F here for acne. Worse on the face on the jaw and cheeks. Using OTC products. On OCP

## 2021-11-12 NOTE — PHYSICAL EXAM
[Alert] : alert [Oriented x 3] : ~L oriented x 3 [Well Nourished] : well nourished [Conjunctiva Non-injected] : conjunctiva non-injected [No Visual Lymphadenopathy] : no visual  lymphadenopathy [No Clubbing] : no clubbing [No Edema] : no edema [No Bromhidrosis] : no bromhidrosis [No Chromhidrosis] : no chromhidrosis [FreeTextEntry3] : Few erythematous papules on the cheeks and jawline

## 2022-02-08 ENCOUNTER — APPOINTMENT (OUTPATIENT)
Dept: DERMATOLOGY | Facility: CLINIC | Age: 27
End: 2022-02-08
Payer: COMMERCIAL

## 2022-02-08 PROCEDURE — 11900 INJECT SKIN LESIONS </W 7: CPT | Mod: GC

## 2022-02-08 PROCEDURE — 99214 OFFICE O/P EST MOD 30 MIN: CPT | Mod: GC,25

## 2022-02-10 ENCOUNTER — TRANSCRIPTION ENCOUNTER (OUTPATIENT)
Age: 27
End: 2022-02-10

## 2022-05-10 ENCOUNTER — APPOINTMENT (OUTPATIENT)
Dept: DERMATOLOGY | Facility: CLINIC | Age: 27
End: 2022-05-10
Payer: COMMERCIAL

## 2022-05-10 DIAGNOSIS — L70.0 ACNE VULGARIS: ICD-10-CM

## 2022-05-10 PROCEDURE — 99213 OFFICE O/P EST LOW 20 MIN: CPT | Mod: GC

## 2022-05-10 RX ORDER — DAPSONE 50 MG/G
5 GEL TOPICAL
Qty: 1 | Refills: 5 | Status: ACTIVE | COMMUNITY
Start: 2021-11-12 | End: 1900-01-01

## 2022-05-11 RX ORDER — ADAPALENE 3 MG/G
0.3 GEL TOPICAL
Qty: 1 | Refills: 4 | Status: ACTIVE | COMMUNITY
Start: 2022-02-08

## 2022-06-27 ENCOUNTER — APPOINTMENT (OUTPATIENT)
Dept: ENDOCRINOLOGY | Facility: CLINIC | Age: 27
End: 2022-06-27
Payer: COMMERCIAL

## 2022-06-27 VITALS
SYSTOLIC BLOOD PRESSURE: 116 MMHG | OXYGEN SATURATION: 98 % | WEIGHT: 135 LBS | TEMPERATURE: 97.3 F | BODY MASS INDEX: 22.49 KG/M2 | DIASTOLIC BLOOD PRESSURE: 74 MMHG | HEIGHT: 65 IN | HEART RATE: 78 BPM

## 2022-06-27 DIAGNOSIS — Z86.59 PERSONAL HISTORY OF OTHER MENTAL AND BEHAVIORAL DISORDERS: ICD-10-CM

## 2022-06-27 PROCEDURE — 99204 OFFICE O/P NEW MOD 45 MIN: CPT

## 2022-06-27 NOTE — DATA REVIEWED
[FreeTextEntry1] : 4/29/2022\par FT4 1.4\par TSH 1.69\par \par 2/2022\par TSH 4.01\par FT4 1.1\par total testosterone 29.1\par free testosterone 2.1\par \par 11/2021\par Tchol 179. HDL 68, TG 72, LDL 95\par HbA1c 4.5\par TSH 2.6\par FT4 1.2\par Tgab 9\par TPOab 13\par Total testosterone (MS_ 51 (ref 2-45)\par free testosterone 1.8 (ref 0.1-6.4)\par vit D 53

## 2022-06-27 NOTE — HISTORY OF PRESENT ILLNESS
[FreeTextEntry1] : 28 yo F with PMH of Hashimoto's, anxiety, here for evaluation of Hashimoto's hypothyroidism.\par \par Reports history of Hashimoto's. Was diagnosed with Hashimoto's after seeing a doctor for something else. Many years ago had blood work at a hospital and was told TPO ab was high in high school and was told to watch labs and symptoms. Has been experiencing a lot of pelvic pain, was told she had pelvic floor dysfunction so then was looking into getting off OCP. \par Reports a lot of change in last year, a lot of stress, hair loss/thinning, new acne (seeing derm). Was told she had Hashimoto's after another doctor ran a lot of blood work. Saw an Endo, Dr. Lo, at [place] in the spring 2022 and was started on levothyroxine. She thinks the highest TSH was 6. Reports history of fatigue, constipation, hair thinning (losing hair in shower), feeling hot a lot prior to starting LT4. \par \par Reports palpitations. Has history of anxiety. Has seen cardiology. \par \par Has been on Unithroid 50 mcg x 3 months.\par \par Menstrual history:\par Menarche: middle school \par reports regular menses; had menorrhagia so was started by GYN\par started OCP in high school; remains on it now. Using tri-lo marzai OCP currently. Had to change to this one after having issues with others (vaginal dryness).\par No significant acne, no hirsutism. \par +moliminal symptoms\par Has never been on spironolactone nor metformin.\par No misses menses on OCP.\par \par No current plans for pregnancy now. \par On OCP since high school due to severe menstrual pain. Was told she had small cysts on US. 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 Boyfriend just graduated med school

## 2022-06-27 NOTE — ASSESSMENT
[Levothyroxine] : The patient was instructed to take Levothyroxine on an empty stomach, separate from vitamins, and wait at least 30 minutes before eating [FreeTextEntry1] : 26 yo F with PMH of Hashimoto's, anxiety, here for evaluation of Hashimoto's hypothyroidism.\par \par #Hashimoto's hypothyroidism\par - appears as if LT4 was started while normal TFTs in setting of significant symptoms (suspect multifactorial per history), pt feels better so will continue LT4 for now\par - continue unithroid 50 mcg daily \par - check TFTs today\par - no plans for pregnancy\par \par #Elevated testosterone - while on OCP on one check total testosterone by MS, prior lab normal\par - discussed that OCP will cofound androgen serum testing, it is possible that she could have higher testosterone based on SHBG elevation/estrogen changes on OCP \par - clinically, pt does not have features of PCOS by regular menstrual history nor does she have features of hyperandrogenism on exam. She currently does not meet criteria for NIH-PCOS\par - discussed that should be decide to come off of OCPs in the future, we can check androgens to assess. Do not think she needs to do this just for biochemical testing at this time\par \par RTC 6 months

## 2022-06-27 NOTE — PHYSICAL EXAM
[Alert] : alert [Well Nourished] : well nourished [No Acute Distress] : no acute distress [Well Developed] : well developed [Normal Sclera/Conjunctiva] : normal sclera/conjunctiva [EOMI] : extra ocular movement intact [No Proptosis] : no proptosis [Normal Oropharynx] : the oropharynx was normal [Supple] : the neck was supple [Thyroid Not Enlarged] : the thyroid was not enlarged [No Thyroid Nodules] : no palpable thyroid nodules [No Respiratory Distress] : no respiratory distress [No Accessory Muscle Use] : no accessory muscle use [Clear to Auscultation] : lungs were clear to auscultation bilaterally [Normal S1, S2] : normal S1 and S2 [Normal Rate] : heart rate was normal [Regular Rhythm] : with a regular rhythm [No Edema] : no peripheral edema [Pedal Pulses Normal] : the pedal pulses are present [Normal Bowel Sounds] : normal bowel sounds [Not Tender] : non-tender [Not Distended] : not distended [Soft] : abdomen soft [Normal Anterior Cervical Nodes] : no anterior cervical lymphadenopathy [Normal Posterior Cervical Nodes] : no posterior cervical lymphadenopathy [No Spinal Tenderness] : no spinal tenderness [Spine Straight] : spine straight [No Stigmata of Cushings Syndrome] : no stigmata of Cushings Syndrome [Normal Gait] : normal gait [No Involuntary Movements] : no involuntary movements were seen [Normal Strength/Tone] : muscle strength and tone were normal [No Rash] : no rash [No Skin Lesions] : no skin lesions [No Tremors] : no tremors [Oriented x3] : oriented to person, place, and time [Normal Affect] : the affect was normal [Normal Insight/Judgement] : insight and judgment were intact

## 2022-06-27 NOTE — REVIEW OF SYSTEMS
[Fatigue] : fatigue [Negative] : Heme/Lymph [All other systems negative] : All other systems negative

## 2022-06-28 ENCOUNTER — TRANSCRIPTION ENCOUNTER (OUTPATIENT)
Age: 27
End: 2022-06-28

## 2022-06-29 ENCOUNTER — RESULT REVIEW (OUTPATIENT)
Age: 27
End: 2022-06-29

## 2022-06-30 ENCOUNTER — TRANSCRIPTION ENCOUNTER (OUTPATIENT)
Age: 27
End: 2022-06-30

## 2022-06-30 LAB
T4 FREE SERPL-MCNC: 1.9 NG/DL
THYROGLOB AB SERPL-ACNC: 54.3 IU/ML
THYROPEROXIDASE AB SERPL IA-ACNC: 11.6 IU/ML
TSH SERPL-ACNC: 0.05 UIU/ML

## 2022-08-25 ENCOUNTER — APPOINTMENT (OUTPATIENT)
Dept: ENDOCRINOLOGY | Facility: CLINIC | Age: 27
End: 2022-08-25

## 2022-10-04 ENCOUNTER — NON-APPOINTMENT (OUTPATIENT)
Age: 27
End: 2022-10-04

## 2022-10-31 ENCOUNTER — RESULT REVIEW (OUTPATIENT)
Age: 27
End: 2022-10-31

## 2023-01-09 ENCOUNTER — APPOINTMENT (OUTPATIENT)
Dept: ENDOCRINOLOGY | Facility: CLINIC | Age: 28
End: 2023-01-09
Payer: COMMERCIAL

## 2023-01-09 VITALS
HEIGHT: 65 IN | OXYGEN SATURATION: 98 % | HEART RATE: 93 BPM | BODY MASS INDEX: 22.49 KG/M2 | SYSTOLIC BLOOD PRESSURE: 110 MMHG | DIASTOLIC BLOOD PRESSURE: 70 MMHG | WEIGHT: 135 LBS

## 2023-01-09 PROCEDURE — 99214 OFFICE O/P EST MOD 30 MIN: CPT

## 2023-01-09 NOTE — OP PT TREATMENT LOG
Exercises Current Session Time   NEURO RE-ED TOTAL TIME FOR SESSION 0-7 Minutes   Diaphragm breathing for ANS quieting yes           THER ACT TOTAL TIME FOR SESSION 23-37 Minutes   reassessment yes   HEP Dilator #3 with gentle movement    Self METS for L superior pube as needed    Diaphragm breathing   THER EX TOTAL TIME FOR SESSION Not performed               MANUAL TOTAL TIME FOR SESSION 23-37 Minutes   MFR to B adductors/low abs; diaphragm release with exhale; mobilization of ribs with inferior glide upon exhale; STM to levator ani (superficial and deep) transvaginal with gentle stretch to L bulbocavernosus METS for L superior pube                MODALITIES TOTAL TIME FOR SESSION 0-7 Minutes   MH to perineum during session          
Patient

## 2023-01-09 NOTE — PHYSICAL EXAM
[Alert] : alert [Well Nourished] : well nourished [No Acute Distress] : no acute distress [Well Developed] : well developed [Normal Sclera/Conjunctiva] : normal sclera/conjunctiva [EOMI] : extra ocular movement intact [No Proptosis] : no proptosis [Normal Oropharynx] : the oropharynx was normal [Supple] : the neck was supple [Thyroid Not Enlarged] : the thyroid was not enlarged [No Thyroid Nodules] : no palpable thyroid nodules [No Respiratory Distress] : no respiratory distress [No Accessory Muscle Use] : no accessory muscle use [Clear to Auscultation] : lungs were clear to auscultation bilaterally [Normal S1, S2] : normal S1 and S2 [Normal Rate] : heart rate was normal [Regular Rhythm] : with a regular rhythm [No Edema] : no peripheral edema [Pedal Pulses Normal] : the pedal pulses are present [Normal Bowel Sounds] : normal bowel sounds [Not Tender] : non-tender [Not Distended] : not distended [Soft] : abdomen soft [Normal Anterior Cervical Nodes] : no anterior cervical lymphadenopathy [No Spinal Tenderness] : no spinal tenderness [Spine Straight] : spine straight [No Stigmata of Cushings Syndrome] : no stigmata of Cushings Syndrome [Normal Gait] : normal gait [No Involuntary Movements] : no involuntary movements were seen [Normal Strength/Tone] : muscle strength and tone were normal [No Rash] : no rash [No Skin Lesions] : no skin lesions [No Tremors] : no tremors [Oriented x3] : oriented to person, place, and time [Normal Affect] : the affect was normal [Normal Insight/Judgement] : insight and judgment were intact

## 2023-01-10 ENCOUNTER — TRANSCRIPTION ENCOUNTER (OUTPATIENT)
Age: 28
End: 2023-01-10

## 2023-01-10 LAB
T4 FREE SERPL-MCNC: 1.1 NG/DL
THYROGLOB AB SERPL-ACNC: 41.2 IU/ML
THYROPEROXIDASE AB SERPL IA-ACNC: 23.2 IU/ML
TSH SERPL-ACNC: 2.63 UIU/ML

## 2023-01-10 NOTE — HISTORY OF PRESENT ILLNESS
[FreeTextEntry1] : 26 yo F with PMH of Hashimoto's, anxiety, here for evaluation of Hashimoto's hypothyroidism.\par \par Reports history of Hashimoto's. Was diagnosed with Hashimoto's after seeing a doctor for something else. Many years ago had blood work at a hospital and was told TPO ab was high in high school and was told to watch labs and symptoms. Has been experiencing a lot of pelvic pain, was told she had pelvic floor dysfunction so then was looking into getting off OCP. \par Reports a lot of change in last year, a lot of stress, hair loss/thinning, new acne (seeing derm). Was told she had Hashimoto's after another doctor ran a lot of blood work. Saw an Endo, Dr. Lo, at [place] in the spring 2022 and was started on levothyroxine 50 mcg and took for 3 months. She thinks the highest TSH was 6. Reports history of fatigue, constipation, hair thinning (losing hair in shower), feeling hot a lot prior to starting LT4. Reports palpitations. Has history of anxiety. Has seen cardiology. \par \par Menstrual history:\par Menarche: middle school \par reports regular menses; had menorrhagia so was started by GYN\par started OCP in high school; remains on it now. Using tri-lo marzai OCP currently. Had to change to this one after having issues with others (vaginal dryness).\par No significant acne, no hirsutism. \par +moliminal symptoms\par Has never been on spironolactone nor metformin.\par No misses menses on OCP.\par \par No current plans for pregnancy now. \par On OCP since high school due to severe menstrual pain. Was told she had small cysts on US. 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 Boyfriend just graduated med school and started EM residency\par \par Interval hx:\par Has been on Unithroid 25 mcg since last visit. Was started at 50 mcg by another doctor  prior to that. On citalopram 20 mg daily. and feels like it has been helping \par TSH in 6/2022 was low 0.05, free t4 high at 1.9. TPO ab negative, +Tg ab\par thinking about coming off ocp but is nervous to

## 2023-01-10 NOTE — ASSESSMENT
[Levothyroxine] : The patient was instructed to take Levothyroxine on an empty stomach, separate from vitamins, and wait at least 30 minutes before eating [FreeTextEntry1] : 26 yo F with PMH of Hashimoto's, anxiety, here for evaluation of Hashimoto's hypothyroidism.\par \par #Hashimoto's hypothyroidism- prior positive TPO now negative. +Tg antibody \par - appears as if LT4 was started while normal TFTs in setting of significant symptoms (suspect multifactorial per history), pt feels better so will continue LT4 for now\par - continue unithroid 25 mcg daily -  appears it was started early for pt while TSH wnl based on available prior labs and TPO is negative here, of note has +tg. TFTs showed low TSH, possible superimposed thyroiditis in 6/2022? Unclear so will repeat labs today\par - check TFTs today\par - no plans for pregnancy\par \par #Elevated testosterone - while on OCP on one check total testosterone by MS, prior lab normal\par - discussed that OCP will cofound androgen serum testing, it is possible that she could have higher testosterone off OCP based on SHBG elevation/estrogen changes on OCP \par - clinically, pt does not have features of PCOS by regular menstrual history prior to OCP initiation nor does she have features of hyperandrogenism on exam. She currently does not meet criteria for NIH-PCOS\par - discussed that should be decide to come off of OCPs in the future, we can check androgens to assess. Would do washout of OCP  x 3 months prior to checking androgens. Pt will let me know of her plans \par \par RTC 6 months

## 2023-01-13 LAB
TESTOST FREE SERPL-MCNC: 0.7 PG/ML
TESTOST SERPL-MCNC: 19.4 NG/DL

## 2023-01-17 LAB — SHBG-ESOTERIX: 219.5 NMOL/L

## 2023-01-18 ENCOUNTER — RX RENEWAL (OUTPATIENT)
Age: 28
End: 2023-01-18

## 2023-01-27 ENCOUNTER — NON-APPOINTMENT (OUTPATIENT)
Age: 28
End: 2023-01-27

## 2023-02-09 ENCOUNTER — NON-APPOINTMENT (OUTPATIENT)
Age: 28
End: 2023-02-09

## 2023-07-10 ENCOUNTER — APPOINTMENT (OUTPATIENT)
Dept: ENDOCRINOLOGY | Facility: CLINIC | Age: 28
End: 2023-07-10
Payer: COMMERCIAL

## 2023-07-10 PROCEDURE — 99214 OFFICE O/P EST MOD 30 MIN: CPT

## 2023-07-11 NOTE — HISTORY OF PRESENT ILLNESS
[FreeTextEntry1] : 27 yo F with PMH of Hashimoto's, anxiety, here for evaluation of Hashimoto's hypothyroidism.\par \par Reports history of Hashimoto's. Was diagnosed with Hashimoto's after seeing a doctor for something else. Many years ago had blood work at a hospital and was told TPO ab was high in high school and was told to watch labs and symptoms. Has been experiencing a lot of pelvic pain, was told she had pelvic floor dysfunction so then was looking into getting off OCP. \par Reports a lot of change in last year, a lot of stress, hair loss/thinning, new acne (seeing derm). Was told she had Hashimoto's after another doctor ran a lot of blood work. Saw an Endo, Dr. Lo, at [place] in the spring 2022 and was started on levothyroxine 50 mcg and took for 3 months. She thinks the highest TSH was 6. Reports history of fatigue, constipation, hair thinning (losing hair in shower), feeling hot a lot prior to starting LT4. Reports palpitations. Has history of anxiety. Has seen cardiology. \par \par Taking celexa and propranol for anxiety and doing better\par \par Menstrual history:\par Menarche: middle school \par reports regular menses; had menorrhagia so was started by GYN\par started OCP in high school; remains on it now. Using tri-lo marzai OCP currently. Had to change to this one after having issues with others (vaginal dryness).\par No significant acne, no hirsutism. \par +moliminal symptoms\par Has never been on spironolactone nor metformin.\par No misses menses on OCP.\par \par No current plans for pregnancy now. \par On OCP since high school due to severe menstrual pain. Was told she had small cysts on US. 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 TPO ab negative, +Tg ab\par \par current LT4 dosing: unithroid 25 mcg\par \par Taking biotin 30k mcg- recently changed to another dose \par \par thinking about coming off ocp but is nervous to. Remains on OCP, has been focusing on mental health, wants to work on some things first then will think about coming off ocp later in the year.\par LMP regular- 2 weeks ago\par No plans for pregnancy

## 2023-07-11 NOTE — ASSESSMENT
[Levothyroxine] : The patient was instructed to take Levothyroxine on an empty stomach, separate from vitamins, and wait at least 30 minutes before eating [FreeTextEntry1] : 27 yo F with PMH of Hashimoto's, anxiety, here for evaluation of Hashimoto's hypothyroidism.\par \par #Hashimoto's hypothyroidism- prior positive TPO now negative. +Tg antibody \par - appears as if LT4 was initially started while normal TFTs in setting of significant symptoms (suspect multifactorial per history), pt feels better so will continue LT4 for now\par - continue unithroid 25 mcg daily -  appears it was started early for pt while TSH wnl based on available prior labs and TPO is negative here but +tg. TFTs showed low TSH, possible superimposed thyroiditis in 6/2022?\par - check TFTs after 1 week biotin washout \par - no plans for pregnancy\par \par #Elevated testosterone - while on OCP on one check total testosterone by MS, prior lab normal. Reports hx of this issue\par - discussed that OCP will cofound androgen serum testing, it is possible that she could have higher testosterone off OCP based on SHBG elevation/estrogen changes on OCP \par - clinically, pt does not have features of PCOS by regular menstrual history prior to OCP initiation nor does she have features of hyperandrogenism on exam. She currently does not meet criteria for NIH-PCOS\par - discussed that should be decide to come off of OCPs in the future, we can check androgens to assess (free and total testosterone, SHBG, 17-OH progesterone, androstenedione, DHEAS). Would do washout of OCP  x 3 months prior to checking androgens. \par \par RTC 6 months

## 2023-07-31 ENCOUNTER — TRANSCRIPTION ENCOUNTER (OUTPATIENT)
Age: 28
End: 2023-07-31

## 2023-07-31 LAB
ALBUMIN SERPL ELPH-MCNC: 4.8 G/DL
ALP BLD-CCNC: 61 U/L
ALT SERPL-CCNC: 18 U/L
ANION GAP SERPL CALC-SCNC: 11 MMOL/L
AST SERPL-CCNC: 17 U/L
BILIRUB SERPL-MCNC: 1.2 MG/DL
BUN SERPL-MCNC: 15 MG/DL
CALCIUM SERPL-MCNC: 9.8 MG/DL
CHLORIDE SERPL-SCNC: 100 MMOL/L
CHOLEST SERPL-MCNC: 190 MG/DL
CO2 SERPL-SCNC: 25 MMOL/L
CREAT SERPL-MCNC: 0.76 MG/DL
EGFR: 110 ML/MIN/1.73M2
ESTIMATED AVERAGE GLUCOSE: 94 MG/DL
GLUCOSE SERPL-MCNC: 81 MG/DL
HBA1C MFR BLD HPLC: 4.9 %
HDLC SERPL-MCNC: 71 MG/DL
LDLC SERPL CALC-MCNC: 101 MG/DL
NONHDLC SERPL-MCNC: 120 MG/DL
POTASSIUM SERPL-SCNC: 4.5 MMOL/L
PROT SERPL-MCNC: 7.5 G/DL
SODIUM SERPL-SCNC: 136 MMOL/L
TRIGL SERPL-MCNC: 105 MG/DL
TSH SERPL-ACNC: 2.05 UIU/ML

## 2024-01-14 ENCOUNTER — NON-APPOINTMENT (OUTPATIENT)
Age: 29
End: 2024-01-14

## 2024-04-30 ENCOUNTER — NON-APPOINTMENT (OUTPATIENT)
Age: 29
End: 2024-04-30

## 2024-05-07 ENCOUNTER — APPOINTMENT (OUTPATIENT)
Dept: ENDOCRINOLOGY | Facility: CLINIC | Age: 29
End: 2024-05-07
Payer: COMMERCIAL

## 2024-05-07 VITALS
HEART RATE: 75 BPM | DIASTOLIC BLOOD PRESSURE: 64 MMHG | BODY MASS INDEX: 22.16 KG/M2 | HEIGHT: 65 IN | SYSTOLIC BLOOD PRESSURE: 100 MMHG | WEIGHT: 133 LBS | OXYGEN SATURATION: 98 %

## 2024-05-07 DIAGNOSIS — R79.89 OTHER SPECIFIED ABNORMAL FINDINGS OF BLOOD CHEMISTRY: ICD-10-CM

## 2024-05-07 DIAGNOSIS — E06.3 AUTOIMMUNE THYROIDITIS: ICD-10-CM

## 2024-05-07 PROCEDURE — G2211 COMPLEX E/M VISIT ADD ON: CPT

## 2024-05-07 PROCEDURE — 99214 OFFICE O/P EST MOD 30 MIN: CPT

## 2024-05-07 NOTE — ASSESSMENT
[Levothyroxine] : The patient was instructed to take Levothyroxine on an empty stomach, separate from vitamins, and wait at least 30 minutes before eating [FreeTextEntry1] : 27 yo F with PMH of Hashimoto's, anxiety, here for evaluation of Hashimoto's hypothyroidism.  #Hashimoto's hypothyroidism- prior positive TPO now negative. +Tg antibody  - appears as if LT4 was initially started while normal TFTs in setting of significant symptoms (suspect multifactorial per history), pt feels better so will continue LT4 for now - continue unithroid 25 mcg daily -  appears it was started early for pt while TSH wnl based on available prior labs and TPO is negative here but +tg. TFTs showed low TSH, possible superimposed thyroiditis in 6/2022? - no plans for pregnancy  #Elevated testosterone - while on OCP on one check total testosterone by MS, prior lab normal. Reports hx of this issue - discussed that OCP will cofound androgen serum testing, it is possible that she could have higher testosterone off OCP based on SHBG elevation/estrogen changes on OCP  - clinically, pt does not have features of PCOS by regular menstrual history prior to OCP initiation nor does she have features of hyperandrogenism on exam.   She currently does not meet criteria for NIH-PCOS - discussed that should be decide to come off of OCPs in the future, we can check androgens to assess (free and total testosterone, SHBG, 17-OH progesterone, androstenedione, DHEAS).   Would do washout of OCP  x 3 months prior to checking androgens. She would like to do labs now.  We will check PCOS labs today, we can repeat them in 3 to 4 months if anything comes back abnormal.  Will do labs on day 2-3 of menstrual cycle.   RTC 6 months.

## 2024-05-07 NOTE — HISTORY OF PRESENT ILLNESS
[FreeTextEntry1] : 30 yo F with PMH of Hashimoto's, anxiety, here for evaluation of Hashimoto's hypothyroidism.  Reports history of Hashimoto's. Was diagnosed with Hashimoto's after seeing a doctor for something else. Many years ago had blood work at a hospital and was told TPO ab was high in high school and was told to watch labs and symptoms. Has been experiencing a lot of pelvic pain, was told she had pelvic floor dysfunction so then was looking into getting off OCP.  Reports a lot of change in last year, a lot of stress, hair loss/thinning, new acne (seeing derm). Was told she had Hashimoto's after another doctor ran a lot of blood work. Saw an Endo, Dr. Lo, at [place] in the spring 2022 and was started on levothyroxine 50 mcg and took for 3 months. She thinks the highest TSH was 6. Reports history of fatigue, constipation, hair thinning (losing hair in shower), feeling hot a lot prior to starting LT4. Reports palpitations. Has history of anxiety. Has seen cardiology.   Menstrual history: Menarche: middle school  reports regular menses; had menorrhagia so was started by GYN No significant acne, no hirsutism.  +moliminal symptoms Has never been on spironolactone nor metformin. 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 TSH in 6/2022 was low 0.05, free t4 high at 1.9. TPO ab negative, +Tg ab Hypothyroidism: current LT4 dosing: unithroid 25 mcg Patient is no longer taking biotin previously she was taking 30 mcg once daily.  5/7/24: Stopped OCP. Patient was on birth control for ten years. Has had one period since stopping.  She states she is a high breast cancer risk, and she just had a TVUS which did not demonstrate cysts.  She is unclear on whether she had cysts.  She does not have hirsutism. She does have a history of acne.  She has had BRCA testing which was negative, Positive family history for breast cancer grandmother, mother, great aunt.  She recently had a breast US, fibrocystic changes possibly. Will be going yearly for breast US.  She had an abnormal finding in her axilla.  She has no plans for future pregnancy.  Stopping Citalopram and transitioning Fluoxetine 15 mg daily.  Taking Propanolol 10 mg daily in the morning.

## 2024-05-23 LAB
ALBUMIN SERPL ELPH-MCNC: 4.6 G/DL
ALP BLD-CCNC: 68 U/L
ALT SERPL-CCNC: 27 U/L
ANION GAP SERPL CALC-SCNC: 12 MMOL/L
AST SERPL-CCNC: 29 U/L
BASOPHILS # BLD AUTO: 0.04 K/UL
BASOPHILS NFR BLD AUTO: 0.8 %
BILIRUB SERPL-MCNC: 0.8 MG/DL
BUN SERPL-MCNC: 11 MG/DL
CALCIUM SERPL-MCNC: 9.7 MG/DL
CHLORIDE SERPL-SCNC: 105 MMOL/L
CHOLEST SERPL-MCNC: 154 MG/DL
CO2 SERPL-SCNC: 22 MMOL/L
CORTIS SERPL-MCNC: 9.9 UG/DL
CREAT SERPL-MCNC: 0.74 MG/DL
DHEA-S SERPL-MCNC: 260 UG/DL
EGFR: 113 ML/MIN/1.73M2
EOSINOPHIL # BLD AUTO: 0.06 K/UL
EOSINOPHIL NFR BLD AUTO: 1.1 %
ESTIMATED AVERAGE GLUCOSE: 94 MG/DL
ESTRADIOL SERPL-MCNC: 73 PG/ML
FERRITIN SERPL-MCNC: 51 NG/ML
FSH SERPL-MCNC: 8.2 IU/L
GLUCOSE SERPL-MCNC: 87 MG/DL
HBA1C MFR BLD HPLC: 4.9 %
HCT VFR BLD CALC: 36.5 %
HDLC SERPL-MCNC: 56 MG/DL
HGB BLD-MCNC: 12.4 G/DL
IGF-1 INTERP: NORMAL
IGF-I BLD-MCNC: 215 NG/ML
IMM GRANULOCYTES NFR BLD AUTO: 0.8 %
IRON SATN MFR SERPL: 34 %
IRON SERPL-MCNC: 98 UG/DL
LDLC SERPL CALC-MCNC: 85 MG/DL
LH SERPL-ACNC: 6.7 IU/L
LYMPHOCYTES # BLD AUTO: 1.59 K/UL
LYMPHOCYTES NFR BLD AUTO: 30.4 %
MAN DIFF?: NORMAL
MCHC RBC-ENTMCNC: 30.9 PG
MCHC RBC-ENTMCNC: 34 GM/DL
MCV RBC AUTO: 91 FL
MONOCYTES # BLD AUTO: 0.39 K/UL
MONOCYTES NFR BLD AUTO: 7.5 %
NEUTROPHILS # BLD AUTO: 3.11 K/UL
NEUTROPHILS NFR BLD AUTO: 59.4 %
NONHDLC SERPL-MCNC: 97 MG/DL
PLATELET # BLD AUTO: 195 K/UL
POTASSIUM SERPL-SCNC: 4.5 MMOL/L
PROLACTIN SERPL-MCNC: 9.5 NG/ML
PROT SERPL-MCNC: 7.1 G/DL
RBC # BLD: 4.01 M/UL
RBC # FLD: 12.7 %
SHBG SERPL-SCNC: 87.1 NMOL/L
SODIUM SERPL-SCNC: 140 MMOL/L
T3 SERPL-MCNC: 103 NG/DL
T4 FREE SERPL-MCNC: 1 NG/DL
TESTOST FREE SERPL-MCNC: 2.2 PG/ML
TESTOST SERPL-MCNC: 33.7 NG/DL
TIBC SERPL-MCNC: 288 UG/DL
TRIGL SERPL-MCNC: 60 MG/DL
TSH SERPL-ACNC: 2.07 UIU/ML
UIBC SERPL-MCNC: 191 UG/DL
WBC # FLD AUTO: 5.23 K/UL

## 2024-06-03 ENCOUNTER — NON-APPOINTMENT (OUTPATIENT)
Age: 29
End: 2024-06-03

## 2024-06-03 LAB — ANDROST SERPL-MCNC: 147 NG/DL

## 2024-07-01 ENCOUNTER — RX RENEWAL (OUTPATIENT)
Age: 29
End: 2024-07-01

## 2024-07-08 ENCOUNTER — RX RENEWAL (OUTPATIENT)
Age: 29
End: 2024-07-08

## 2024-12-19 ENCOUNTER — APPOINTMENT (OUTPATIENT)
Dept: ENDOCRINOLOGY | Facility: CLINIC | Age: 29
End: 2024-12-19
Payer: COMMERCIAL

## 2024-12-19 DIAGNOSIS — R79.89 OTHER SPECIFIED ABNORMAL FINDINGS OF BLOOD CHEMISTRY: ICD-10-CM

## 2024-12-19 DIAGNOSIS — R53.82 CHRONIC FATIGUE, UNSPECIFIED: ICD-10-CM

## 2024-12-19 DIAGNOSIS — E06.3 AUTOIMMUNE THYROIDITIS: ICD-10-CM

## 2024-12-19 PROCEDURE — 99214 OFFICE O/P EST MOD 30 MIN: CPT

## 2024-12-19 PROCEDURE — G2211 COMPLEX E/M VISIT ADD ON: CPT

## 2024-12-24 ENCOUNTER — RX RENEWAL (OUTPATIENT)
Age: 29
End: 2024-12-24

## 2025-02-03 ENCOUNTER — NON-APPOINTMENT (OUTPATIENT)
Age: 30
End: 2025-02-03

## 2025-02-07 ENCOUNTER — TRANSCRIPTION ENCOUNTER (OUTPATIENT)
Age: 30
End: 2025-02-07

## 2025-02-07 DIAGNOSIS — E55.9 VITAMIN D DEFICIENCY, UNSPECIFIED: ICD-10-CM

## 2025-02-07 RX ORDER — CHOLECALCIFEROL (VITAMIN D3) 25 MCG
25 MCG TABLET ORAL DAILY
Qty: 90 | Refills: 1 | Status: ACTIVE | COMMUNITY
Start: 2025-02-07 | End: 1900-01-01

## 2025-02-11 ENCOUNTER — TRANSCRIPTION ENCOUNTER (OUTPATIENT)
Age: 30
End: 2025-02-11

## 2025-02-14 ENCOUNTER — TRANSCRIPTION ENCOUNTER (OUTPATIENT)
Age: 30
End: 2025-02-14

## 2025-03-14 ENCOUNTER — NON-APPOINTMENT (OUTPATIENT)
Age: 30
End: 2025-03-14

## 2025-03-14 ENCOUNTER — APPOINTMENT (OUTPATIENT)
Dept: SURGERY | Facility: CLINIC | Age: 30
End: 2025-03-14
Payer: COMMERCIAL

## 2025-03-14 VITALS
OXYGEN SATURATION: 98 % | HEART RATE: 66 BPM | TEMPERATURE: 94.4 F | DIASTOLIC BLOOD PRESSURE: 70 MMHG | SYSTOLIC BLOOD PRESSURE: 107 MMHG | RESPIRATION RATE: 18 BRPM | WEIGHT: 140 LBS | BODY MASS INDEX: 23.32 KG/M2 | HEIGHT: 65 IN

## 2025-03-14 DIAGNOSIS — Z83.79 FAMILY HISTORY OF OTHER DISEASES OF THE DIGESTIVE SYSTEM: ICD-10-CM

## 2025-03-14 DIAGNOSIS — Z87.19 PERSONAL HISTORY OF OTHER DISEASES OF THE DIGESTIVE SYSTEM: ICD-10-CM

## 2025-03-14 DIAGNOSIS — Z80.0 FAMILY HISTORY OF MALIGNANT NEOPLASM OF DIGESTIVE ORGANS: ICD-10-CM

## 2025-03-14 DIAGNOSIS — K60.2 ANAL FISSURE, UNSPECIFIED: ICD-10-CM

## 2025-03-14 DIAGNOSIS — Z78.9 OTHER SPECIFIED HEALTH STATUS: ICD-10-CM

## 2025-03-14 PROCEDURE — 99203 OFFICE O/P NEW LOW 30 MIN: CPT

## 2025-03-14 RX ORDER — PROPRANOLOL HYDROCHLORIDE 10 MG/1
10 TABLET ORAL
Refills: 0 | Status: ACTIVE | COMMUNITY

## 2025-03-14 RX ORDER — FLUOXETINE HYDROCHLORIDE 20 MG/1
20 CAPSULE ORAL
Refills: 0 | Status: ACTIVE | COMMUNITY

## 2025-03-17 RX ORDER — NITROGLYCERIN 20 MG/G
2 OINTMENT TOPICAL
Qty: 1 | Refills: 0 | Status: ACTIVE | COMMUNITY
Start: 2025-03-17 | End: 1900-01-01

## 2025-03-27 ENCOUNTER — APPOINTMENT (OUTPATIENT)
Dept: RADIOLOGY | Facility: CLINIC | Age: 30
End: 2025-03-27
Payer: COMMERCIAL

## 2025-03-27 ENCOUNTER — OUTPATIENT (OUTPATIENT)
Dept: OUTPATIENT SERVICES | Facility: HOSPITAL | Age: 30
LOS: 1 days | End: 2025-03-27
Payer: COMMERCIAL

## 2025-03-27 ENCOUNTER — APPOINTMENT (OUTPATIENT)
Dept: RHEUMATOLOGY | Facility: CLINIC | Age: 30
End: 2025-03-27
Payer: COMMERCIAL

## 2025-03-27 VITALS
HEIGHT: 65 IN | SYSTOLIC BLOOD PRESSURE: 100 MMHG | HEART RATE: 77 BPM | BODY MASS INDEX: 23.32 KG/M2 | WEIGHT: 140 LBS | OXYGEN SATURATION: 99 % | DIASTOLIC BLOOD PRESSURE: 63 MMHG | RESPIRATION RATE: 16 BRPM

## 2025-03-27 DIAGNOSIS — M25.551 PAIN IN RIGHT HIP: ICD-10-CM

## 2025-03-27 DIAGNOSIS — R53.82 CHRONIC FATIGUE, UNSPECIFIED: ICD-10-CM

## 2025-03-27 PROBLEM — M25.50 POLYARTHRALGIA: Status: ACTIVE | Noted: 2025-03-27

## 2025-03-27 PROCEDURE — 73522 X-RAY EXAM HIPS BI 3-4 VIEWS: CPT

## 2025-03-27 PROCEDURE — 73522 X-RAY EXAM HIPS BI 3-4 VIEWS: CPT | Mod: 26

## 2025-03-27 PROCEDURE — 99205 OFFICE O/P NEW HI 60 MIN: CPT

## 2025-04-03 ENCOUNTER — APPOINTMENT (OUTPATIENT)
Dept: RHEUMATOLOGY | Facility: CLINIC | Age: 30
End: 2025-04-03
Payer: COMMERCIAL

## 2025-04-03 DIAGNOSIS — M25.50 PAIN IN UNSPECIFIED JOINT: ICD-10-CM

## 2025-04-03 DIAGNOSIS — M25.551 PAIN IN RIGHT HIP: ICD-10-CM

## 2025-04-03 DIAGNOSIS — R76.0 RAISED ANTIBODY TITER: ICD-10-CM

## 2025-04-03 DIAGNOSIS — M25.552 PAIN IN RIGHT HIP: ICD-10-CM

## 2025-04-03 DIAGNOSIS — R53.82 CHRONIC FATIGUE, UNSPECIFIED: ICD-10-CM

## 2025-04-03 LAB
25(OH)D3 SERPL-MCNC: 31 NG/ML
ALBUMIN SERPL ELPH-MCNC: 4.7 G/DL
ALP BLD-CCNC: 73 U/L
ALT SERPL-CCNC: 12 U/L
ANION GAP SERPL CALC-SCNC: 13 MMOL/L
AST SERPL-CCNC: 17 U/L
BASOPHILS # BLD AUTO: 0.05 K/UL
BASOPHILS NFR BLD AUTO: 0.9 %
BILIRUB SERPL-MCNC: 0.8 MG/DL
BUN SERPL-MCNC: 12 MG/DL
CALCIUM SERPL-MCNC: 9.8 MG/DL
CCP AB SER IA-ACNC: 123 U/ML
CHLORIDE SERPL-SCNC: 105 MMOL/L
CK SERPL-CCNC: 48 U/L
CO2 SERPL-SCNC: 21 MMOL/L
CREAT SERPL-MCNC: 0.66 MG/DL
CRP SERPL-MCNC: <3 MG/L
EGFRCR SERPLBLD CKD-EPI 2021: 122 ML/MIN/1.73M2
EOSINOPHIL # BLD AUTO: 0.02 K/UL
EOSINOPHIL NFR BLD AUTO: 0.3 %
ERYTHROCYTE [SEDIMENTATION RATE] IN BLOOD BY WESTERGREN METHOD: 19 MM/HR
GLUCOSE SERPL-MCNC: 85 MG/DL
HCT VFR BLD CALC: 35.6 %
HGB BLD-MCNC: 12.1 G/DL
IMM GRANULOCYTES NFR BLD AUTO: 0.5 %
LYMPHOCYTES # BLD AUTO: 1.81 K/UL
LYMPHOCYTES NFR BLD AUTO: 31 %
MAN DIFF?: NORMAL
MCHC RBC-ENTMCNC: 31 PG
MCHC RBC-ENTMCNC: 34 G/DL
MCV RBC AUTO: 91.3 FL
MONOCYTES # BLD AUTO: 0.48 K/UL
MONOCYTES NFR BLD AUTO: 8.2 %
NEUTROPHILS # BLD AUTO: 3.44 K/UL
NEUTROPHILS NFR BLD AUTO: 59.1 %
PLATELET # BLD AUTO: 205 K/UL
POTASSIUM SERPL-SCNC: 4.2 MMOL/L
PROT SERPL-MCNC: 7.2 G/DL
RBC # BLD: 3.9 M/UL
RBC # FLD: 12.8 %
RF+CCP IGG SER-IMP: POSITIVE
RHEUMATOID FACT SER QL: <10 IU/ML
SODIUM SERPL-SCNC: 139 MMOL/L
TSH SERPL-ACNC: 1.17 UIU/ML
WBC # FLD AUTO: 5.83 K/UL

## 2025-04-03 PROCEDURE — 99214 OFFICE O/P EST MOD 30 MIN: CPT | Mod: 95

## 2025-04-14 ENCOUNTER — APPOINTMENT (OUTPATIENT)
Dept: MRI IMAGING | Facility: IMAGING CENTER | Age: 30
End: 2025-04-14
Payer: COMMERCIAL

## 2025-04-14 ENCOUNTER — OUTPATIENT (OUTPATIENT)
Dept: OUTPATIENT SERVICES | Facility: HOSPITAL | Age: 30
LOS: 1 days | End: 2025-04-14
Payer: COMMERCIAL

## 2025-04-14 DIAGNOSIS — M25.551 PAIN IN RIGHT HIP: ICD-10-CM

## 2025-04-14 DIAGNOSIS — Z00.8 ENCOUNTER FOR OTHER GENERAL EXAMINATION: ICD-10-CM

## 2025-04-14 PROCEDURE — 73721 MRI JNT OF LWR EXTRE W/O DYE: CPT | Mod: 26,RT

## 2025-04-14 PROCEDURE — 73721 MRI JNT OF LWR EXTRE W/O DYE: CPT

## 2025-04-28 ENCOUNTER — APPOINTMENT (OUTPATIENT)
Dept: ORTHOPEDIC SURGERY | Facility: CLINIC | Age: 30
End: 2025-04-28
Payer: COMMERCIAL

## 2025-04-28 VITALS
WEIGHT: 140 LBS | DIASTOLIC BLOOD PRESSURE: 78 MMHG | SYSTOLIC BLOOD PRESSURE: 115 MMHG | BODY MASS INDEX: 23.32 KG/M2 | HEART RATE: 71 BPM | HEIGHT: 65 IN

## 2025-04-28 DIAGNOSIS — M25.551 PAIN IN RIGHT HIP: ICD-10-CM

## 2025-04-28 DIAGNOSIS — M25.552 PAIN IN RIGHT HIP: ICD-10-CM

## 2025-04-28 PROCEDURE — 99205 OFFICE O/P NEW HI 60 MIN: CPT

## 2025-07-03 ENCOUNTER — APPOINTMENT (OUTPATIENT)
Dept: ORTHOPEDIC SURGERY | Facility: CLINIC | Age: 30
End: 2025-07-03

## 2025-08-05 ENCOUNTER — APPOINTMENT (OUTPATIENT)
Dept: ORTHOPEDIC SURGERY | Facility: CLINIC | Age: 30
End: 2025-08-05

## 2025-08-06 ENCOUNTER — APPOINTMENT (OUTPATIENT)
Dept: ORTHOPEDIC SURGERY | Facility: CLINIC | Age: 30
End: 2025-08-06

## 2025-08-08 ENCOUNTER — APPOINTMENT (OUTPATIENT)
Dept: ORTHOPEDIC SURGERY | Facility: CLINIC | Age: 30
End: 2025-08-08
Payer: COMMERCIAL

## 2025-08-08 ENCOUNTER — NON-APPOINTMENT (OUTPATIENT)
Age: 30
End: 2025-08-08

## 2025-08-08 DIAGNOSIS — M25.552 PAIN IN RIGHT HIP: ICD-10-CM

## 2025-08-08 DIAGNOSIS — M25.551 PAIN IN RIGHT HIP: ICD-10-CM

## 2025-08-08 DIAGNOSIS — M24.151 OTHER ARTICULAR CARTILAGE DISORDERS, RIGHT HIP: ICD-10-CM

## 2025-08-08 PROCEDURE — 99214 OFFICE O/P EST MOD 30 MIN: CPT
